# Patient Record
Sex: FEMALE
[De-identification: names, ages, dates, MRNs, and addresses within clinical notes are randomized per-mention and may not be internally consistent; named-entity substitution may affect disease eponyms.]

---

## 2020-05-13 ENCOUNTER — HOSPITAL ENCOUNTER (OUTPATIENT)
Dept: HOSPITAL 92 - SDC | Age: 20
Discharge: HOME | End: 2020-05-13
Attending: OTOLARYNGOLOGY
Payer: COMMERCIAL

## 2020-05-13 VITALS — BODY MASS INDEX: 41.1 KG/M2

## 2020-05-13 DIAGNOSIS — G47.33: ICD-10-CM

## 2020-05-13 DIAGNOSIS — J35.03: Primary | ICD-10-CM

## 2020-05-13 PROCEDURE — 88304 TISSUE EXAM BY PATHOLOGIST: CPT

## 2020-05-13 PROCEDURE — 0CTQXZZ RESECTION OF ADENOIDS, EXTERNAL APPROACH: ICD-10-PCS | Performed by: OTOLARYNGOLOGY

## 2020-05-13 PROCEDURE — 0CTPXZZ RESECTION OF TONSILS, EXTERNAL APPROACH: ICD-10-PCS | Performed by: OTOLARYNGOLOGY

## 2020-05-14 NOTE — OP
DATE OF PROCEDURE:  05/13/2020



PREOPERATIVE DIAGNOSES:  

1. Chronic adenotonsillitis.

2. Adenotonsillar hypertrophy.



POSTOPERATIVE DIAGNOSES:  

1. Chronic adenotonsillitis.

2. Adenotonsillar hypertrophy.



PROCEDURES PERFORMED:  Tonsillectomy and adenoidectomy.



ESTIMATED BLOOD LOSS:  0 mL.



COMPLICATIONS:  None.



ANESTHESIA:  GETA.



PROCEDURE IN DETAIL:  After consent was obtained, the patient was identified,

brought to the operating room, and placed on the operating table in the supine

position.  General endotracheal anesthesia and intravenous access was obtained and

we proceeded with positioning the patient for oropharyngeal surgery.  Oropharyngeal

exposure was obtained with a Alex-Koby mouth gag after a head drape was placed and

secured with a towel clip.  The Alex-Koby mouth gag was then suspended from the

Ricardo tray and palatal elevation was achieved with a red rubber catheter.  The right

tonsil was addressed first.  We used a curved Allis to grasp the tonsil and retract

it medially as an anterior pillar incision was made.  The retrotonsillar fascial

plane was then established and blunt dissection was performed with the suction

cautery.  Blood vessels were anticipated, identified, and cauterized as they were

encountered.  Ultimately, dissection was carried to the posterior tonsillar pillar

mucosa which was incised hemostatically, as well as the base of tongue connection.

The tonsil was then passed off as a specimen and bleeding points within the

tonsillar bed were cauterized under direct visualization.  We subsequently turned

our attention to the contralateral side, where using a similar technique, a near

identical procedure was performed.  Again, the tonsil was grasped and retracted

medially with a curved Allis.  The retrotonsillar fascial plane was established and

while the anterior pillar was retracted medially, the hemostatic blunt dissection of

the tonsil with a suction cautery was performed with blood vessels anticipated,

identified, and cauterized as they were encountered.  Again, dissection continued to

the base of tongue and posterior tonsillar pillar mucosa which was incised in a

hemostatic fashion.  The tonsillar beds were then carefully inspected and bleeding

points were identified and cauterized with a suction cautery.  After this portion of

the procedure, hemostasis was completely obtained. 



Under direct mirror visualization, we visualized the adenoid pad.  Under direct

mirror visualization, we removed the bulk of the adenoid tissue with the adenoid

curette.  We then packed the nasopharynx for an appropriate period of time with

Dom-Synephrine saturated tonsillar sponges.  After a period of observation, we

removed the pack.  Under indirect mirror visualization, we obtained hemostasis and

vaporization of residual adenoid tissue with electrocautery.  The patient's oral

cavity was copiously irrigated with iced saline and subsequently suctioned.  After

completion of the procedure, the nasal cavity and oropharynx were irrigated and

suctioned as were the gastric contents.  The patient was then awakened and

transferred to the recovery room where the patient remained in stable condition

prior to discharge to Day Stay. 







Job ID:  [id number]